# Patient Record
Sex: MALE | Race: ASIAN | NOT HISPANIC OR LATINO
[De-identification: names, ages, dates, MRNs, and addresses within clinical notes are randomized per-mention and may not be internally consistent; named-entity substitution may affect disease eponyms.]

---

## 2020-08-19 ENCOUNTER — APPOINTMENT (OUTPATIENT)
Dept: PEDIATRIC ALLERGY IMMUNOLOGY | Facility: CLINIC | Age: 50
End: 2020-08-19
Payer: MEDICAID

## 2020-08-19 VITALS
TEMPERATURE: 98.6 F | DIASTOLIC BLOOD PRESSURE: 79 MMHG | OXYGEN SATURATION: 98 % | WEIGHT: 260 LBS | HEART RATE: 93 BPM | BODY MASS INDEX: 40.81 KG/M2 | SYSTOLIC BLOOD PRESSURE: 118 MMHG | HEIGHT: 67 IN

## 2020-08-19 DIAGNOSIS — L30.8 OTHER SPECIFIED DERMATITIS: ICD-10-CM

## 2020-08-19 DIAGNOSIS — I10 ESSENTIAL (PRIMARY) HYPERTENSION: ICD-10-CM

## 2020-08-19 DIAGNOSIS — R73.03 PREDIABETES.: ICD-10-CM

## 2020-08-19 DIAGNOSIS — E78.00 PURE HYPERCHOLESTEROLEMIA, UNSPECIFIED: ICD-10-CM

## 2020-08-19 PROBLEM — Z00.00 ENCOUNTER FOR PREVENTIVE HEALTH EXAMINATION: Status: ACTIVE | Noted: 2020-08-19

## 2020-08-19 PROCEDURE — 99204 OFFICE O/P NEW MOD 45 MIN: CPT

## 2020-08-19 RX ORDER — ACETAMINOPHEN 325 MG/1
TABLET, FILM COATED ORAL
Refills: 0 | Status: ACTIVE | COMMUNITY

## 2020-08-19 NOTE — REASON FOR VISIT
[Initial Consultation] : an initial consultation for [To Medication] : allergy to medication [[employer] ] : provided by [employer]   [FreeTextEntry1] : [id number] [TWNoteComboBox1] : Jet

## 2020-08-19 NOTE — CONSULT LETTER
[Dear  ___] : Dear  [unfilled], [Consult Letter:] : I had the pleasure of evaluating your patient, [unfilled]. [Please see my note below.] : Please see my note below. [Consult Closing:] : Thank you very much for allowing me to participate in the care of this patient.  If you have any questions, please do not hesitate to contact me. [Sincerely,] : Sincerely, [FreeTextEntry3] : Anastasia Miles MD FAAANIL, NING\par Adult and Pediatric Allergy, Asthma and Clinical Immunology\par  of Medicine and Pediatrics at\par   [facility]\par Section Head, Adult Allergy and Immunology\par   [facility]\par   Division of Allergy, Asthma and Immunology\par   [address] [place] [address], [place] [address]\par   [place] [address]\par   Phone [phone]  Fax [phone]\par \par

## 2020-08-19 NOTE — HISTORY OF PRESENT ILLNESS
[Asthma] : asthma [Venom Reactions] : venom reactions [Food Allergies] : food allergies [de-identified] : YOSVANY ANDERSON  is a 50 year year old  male  with hypertension, hyperlipidemia, prediabetes, eczema [facility] of  multiple medication allergies. He was referred by PCP Dr Get Jiménez.\par \par Multiple drug reactions recorded to NSAIDs: Brufen, Novalgin, penicillin, sulfa/ Bactrim, tetracycline, doxycycline, minocycline, Cipro, Ofloxacin, Chloroquine, Chloromycetin. History of reactions to these medications is unclear. \par \par He is currently followed by dermatologist for skin rashes. \par Patient reports that he developed  swelling that he describes as water blisters  on the lips, fingers, toes and rectal area. He wasn't able to eat because of blisters in the mouth.  All of these reactions occur in [place]. Patient states that  he had total of 3 reactions (2007, 2008) He was told that these reactions were due to medication allergies and  symptoms resolved shortly  after taking an allergy medications. This list of drug allergies was given to him by a dermatologist in [place]. He hasn't had any reactions since 2008. \par

## 2020-08-19 NOTE — REVIEW OF SYSTEMS
[Atopic Dermatitis] : atopic dermatitis [Pruritus] : pruritus [Nl] : Hematologic/Lymphatic [Fatigue] : no fatigue [Fever] : no fever [Headache] : no headache [Limping] : no limping [FreeTextEntry7] : gas

## 2020-08-19 NOTE — PHYSICAL EXAM
[Alert] : alert [Healthy Appearance] : healthy appearance [No Acute Distress] : no acute distress [Well Developed] : well developed [No Photophobia] : no photophobia [Sclera Not Icteric] : sclera not icteric [No Discharge] : no discharge [No Thrush] : no thrush [No Oral Lesions or Ulcers] : no oral lesions or ulcers [No Neck Mass] : no neck mass was observed [Supple] : the neck was supple [Normal Rate and Effort] : normal respiratory rhythm and effort [Bilateral Audible Breath Sounds] : bilateral audible breath sounds [No Crackles] : no crackles [Normal Rate] : heart rate was normal  [Regular Rhythm] : with a regular rhythm [Soft] : abdomen soft [Not Tender] : non-tender [No HSM] : no hepato-splenomegaly [Normal Cervical Lymph Nodes] : cervical [No Skin Lesions] : no skin lesions [No Edema] : no edema [No clubbing] : no clubbing [No Cyanosis] : no cyanosis [Normal Mood] : mood was normal [Normal Affect] : affect was normal [Alert, Awake, Oriented as Age-Appropriate] : alert, awake, oriented as age appropriate [Normal Voice/Communication] : normal voice communication [Conjunctival Erythema] : no conjunctival erythema [Suborbital Bogginess] : no suborbital bogginess (allergic shiners) [Wheezing] : no wheezing was heard [Eczematous Patches] : eczematous patches present

## 2020-08-21 LAB
ALBUMIN SERPL ELPH-MCNC: 4.7 G/DL
ALP BLD-CCNC: 99 U/L
ALT SERPL-CCNC: 50 U/L
ANION GAP SERPL CALC-SCNC: 17 MMOL/L
AST SERPL-CCNC: 60 U/L
BASOPHILS # BLD AUTO: 0.03 K/UL
BASOPHILS NFR BLD AUTO: 0.4 %
BILIRUB SERPL-MCNC: 0.3 MG/DL
BUN SERPL-MCNC: 8 MG/DL
CALCIUM SERPL-MCNC: 9.6 MG/DL
CHLORIDE SERPL-SCNC: 102 MMOL/L
CO2 SERPL-SCNC: 24 MMOL/L
CREAT SERPL-MCNC: 0.74 MG/DL
EOSINOPHIL # BLD AUTO: 0.16 K/UL
EOSINOPHIL NFR BLD AUTO: 2.1 %
GLUCOSE SERPL-MCNC: 93 MG/DL
HCT VFR BLD CALC: 48.4 %
HGB BLD-MCNC: 15.4 G/DL
IMM GRANULOCYTES NFR BLD AUTO: 0.1 %
LYMPHOCYTES # BLD AUTO: 2.61 K/UL
LYMPHOCYTES NFR BLD AUTO: 33.6 %
MAN DIFF?: NORMAL
MCHC RBC-ENTMCNC: 30 PG
MCHC RBC-ENTMCNC: 31.8 GM/DL
MCV RBC AUTO: 94.3 FL
MONOCYTES # BLD AUTO: 0.86 K/UL
MONOCYTES NFR BLD AUTO: 11.1 %
NEUTROPHILS # BLD AUTO: 4.09 K/UL
NEUTROPHILS NFR BLD AUTO: 52.7 %
PLATELET # BLD AUTO: 248 K/UL
POTASSIUM SERPL-SCNC: 4.1 MMOL/L
PROT SERPL-MCNC: 7.7 G/DL
RBC # BLD: 5.13 M/UL
RBC # FLD: 13.1 %
SODIUM SERPL-SCNC: 143 MMOL/L
TOTAL IGE SMQN RAST: 168 KU/L
WBC # FLD AUTO: 7.76 K/UL

## 2020-09-15 ENCOUNTER — APPOINTMENT (OUTPATIENT)
Dept: PEDIATRIC ALLERGY IMMUNOLOGY | Facility: CLINIC | Age: 50
End: 2020-09-15

## 2020-09-21 ENCOUNTER — APPOINTMENT (OUTPATIENT)
Dept: PEDIATRIC ALLERGY IMMUNOLOGY | Facility: CLINIC | Age: 50
End: 2020-09-21

## 2020-12-01 ENCOUNTER — APPOINTMENT (OUTPATIENT)
Dept: PEDIATRIC ALLERGY IMMUNOLOGY | Facility: CLINIC | Age: 50
End: 2020-12-01
Payer: MEDICAID

## 2020-12-01 VITALS
HEART RATE: 87 BPM | TEMPERATURE: 96.8 F | DIASTOLIC BLOOD PRESSURE: 82 MMHG | BODY MASS INDEX: 40.65 KG/M2 | HEIGHT: 67 IN | SYSTOLIC BLOOD PRESSURE: 144 MMHG | WEIGHT: 259 LBS | OXYGEN SATURATION: 95 %

## 2020-12-01 DIAGNOSIS — T37.8X5A ADVERSE EFFECT OF OTHER SPECIFIED SYSTEMIC ANTI-INFECTIVES AND ANTIPARASITICS, INITIAL ENCOUNTER: ICD-10-CM

## 2020-12-01 DIAGNOSIS — T37.0X5A ADVERSE EFFECT OF SULFONAMIDES, INITIAL ENCOUNTER: ICD-10-CM

## 2020-12-01 DIAGNOSIS — T50.905A ADVERSE EFFECT OF UNSPECIFIED DRUGS, MEDICAMENTS AND BIOLOGICAL SUBSTANCES, INITIAL ENCOUNTER: ICD-10-CM

## 2020-12-01 DIAGNOSIS — T36.0X5A ADVERSE EFFECT OF PENICILLINS, INITIAL ENCOUNTER: ICD-10-CM

## 2020-12-01 PROCEDURE — 99214 OFFICE O/P EST MOD 30 MIN: CPT | Mod: 25

## 2020-12-01 PROCEDURE — 95018 ALL TSTG PERQ&IQ DRUGS/BIOL: CPT

## 2020-12-01 PROCEDURE — 99072 ADDL SUPL MATRL&STAF TM PHE: CPT

## 2020-12-01 RX ORDER — ISONIAZID 300 MG/1
300 TABLET ORAL
Refills: 0 | Status: ACTIVE | COMMUNITY

## 2020-12-01 RX ORDER — CLOTRIMAZOLE 10 MG/G
CREAM VAGINAL
Refills: 0 | Status: ACTIVE | COMMUNITY

## 2020-12-01 RX ORDER — MULTIVITAMIN
CAPSULE ORAL
Refills: 0 | Status: ACTIVE | COMMUNITY

## 2020-12-01 RX ORDER — LOSARTAN POTASSIUM AND HYDROCHLOROTHIAZIDE 12.5; 5 MG/1; MG/1
50-12.5 TABLET ORAL
Refills: 0 | Status: ACTIVE | COMMUNITY

## 2020-12-01 RX ORDER — ASPIRIN 81 MG
81 TABLET, DELAYED RELEASE (ENTERIC COATED) ORAL
Refills: 0 | Status: ACTIVE | COMMUNITY

## 2020-12-01 RX ORDER — LOSARTAN POTASSIUM 100 MG/1
TABLET, FILM COATED ORAL
Refills: 0 | Status: COMPLETED | COMMUNITY
End: 2020-12-01

## 2020-12-01 RX ORDER — LACTOBACILLUS ACIDOPHILUS/PECT 30 MG-20MG
TABLET ORAL
Refills: 0 | Status: ACTIVE | COMMUNITY

## 2020-12-01 RX ORDER — TRIAMCINOLONE ACETONIDE 1 MG/G
0.1 CREAM TOPICAL
Refills: 0 | Status: ACTIVE | COMMUNITY

## 2020-12-01 RX ORDER — GLIMEPIRIDE 4 MG/1
4 TABLET ORAL
Refills: 0 | Status: ACTIVE | COMMUNITY

## 2020-12-01 NOTE — IMPRESSION
[FreeTextEntry1] : Epicutaneous and intradermal skin tests were negative to Penicillin (10,000 U/mL), PrePen, Ampicillin, Cipro and Levofloxacin  with adequate controls.

## 2020-12-01 NOTE — REASON FOR VISIT
[Routine Follow-Up] : a routine follow-up visit for [Allergy Evaluation/ Skin Testing] : allergy evaluation and or skin testing [To Medication] : allergy to medication

## 2020-12-01 NOTE — PHYSICAL EXAM
[Alert] : alert [No Acute Distress] : no acute distress [Well Developed] : well developed [Normal Voice/Communication] : normal voice communication [No Discharge] : no discharge [No Photophobia] : no photophobia [Sclera Not Icteric] : sclera not icteric [Conjunctival Erythema] : no conjunctival erythema [No Thrush] : no thrush [No Oral Lesions or Ulcers] : no oral lesions or ulcers [Boggy Nasal Turbinates] : no boggy and/or pale nasal turbinates [No Neck Mass] : no neck mass was observed [Supple] : the neck was supple [Normal Rate and Effort] : normal respiratory rhythm and effort [No Crackles] : no crackles [Bilateral Audible Breath Sounds] : bilateral audible breath sounds [Wheezing] : no wheezing was heard [Normal Rate] : heart rate was normal  [Regular Rhythm] : with a regular rhythm [Soft] : abdomen soft [Not Tender] : non-tender [Normal Cervical Lymph Nodes] : cervical [No Skin Lesions] : no skin lesions [Eczematous Patches] : eczematous patches present [No clubbing] : no clubbing [No Edema] : no edema [No Cyanosis] : no cyanosis [Normal Mood] : mood was normal [Normal Affect] : affect was normal [Alert, Awake, Oriented as Age-Appropriate] : alert, awake, oriented as age appropriate

## 2020-12-01 NOTE — REVIEW OF SYSTEMS
[Fatigue] : no fatigue [Fever] : no fever [Limping] : no limping [Atopic Dermatitis] : atopic dermatitis [Pruritus] : pruritus [Nl] : Hematologic/Lymphatic [FreeTextEntry7] : gas

## 2020-12-01 NOTE — HISTORY OF PRESENT ILLNESS
[Asthma] : asthma [Venom Reactions] : venom reactions [Food Allergies] : food allergies [de-identified] : YOSVANY ANDERSON  is a 50 year year old  male  with hypertension, hyperlipidemia, prediabetes, eczema and multiple medication allergies. He was referred by PCP Dr Get Jiménez.\par \par - today he returns for penicillin and quinolone skin testing\par - no interval issues\par \par Multiple drug reactions recorded to NSAIDs: Brufen, Novalgin, penicillin, sulfa/ Bactrim, tetracycline, doxycycline, minocycline, Cipro, Ofloxacin, Chloroquine, Chloromycetin. History of reactions to these medications is unclear. \par \par He is currently followed by dermatologist for skin rashes. \par Patient reports that he developed  swelling that he describes as water blisters  on the lips, fingers, toes and rectal area. He wasn't able to eat because of blisters in the mouth.  All of these reactions occur in [place]. Patient states that  he had total of 3 reactions (2007, 2008) He was told that these reactions were due to medication allergies and  symptoms resolved shortly  after taking an allergy medications. This list of drug allergies was given to him by a dermatologist in [place]. He hasn't had any reactions since 2008. \par

## 2020-12-01 NOTE — CONSULT LETTER
[Dear  ___] : Dear  [unfilled], [Consult Letter:] : I had the pleasure of evaluating your patient, [unfilled]. [Please see my note below.] : Please see my note below. [Consult Closing:] : Thank you very much for allowing me to participate in the care of this patient.  If you have any questions, please do not hesitate to contact me. [Sincerely,] : Sincerely, [FreeTextEntry3] : Anastasia Miles MD FAAANIL, NING\par Adult and Pediatric Allergy, Asthma and Clinical Immunology\par  of Medicine and Pediatrics at\par   [facility]\par Section Head, Adult Allergy and Immunology\par   [facility]\par   Division of Allergy, Asthma and Immunology\par   [address] [place] [address], [place] [address]\par   [place] [address]\par   Phone [phone]  Fax [phone]\par \par

## 2020-12-08 ENCOUNTER — APPOINTMENT (OUTPATIENT)
Dept: PEDIATRIC ALLERGY IMMUNOLOGY | Facility: CLINIC | Age: 50
End: 2020-12-08
Payer: MEDICAID

## 2020-12-08 VITALS — SYSTOLIC BLOOD PRESSURE: 131 MMHG | DIASTOLIC BLOOD PRESSURE: 79 MMHG | RESPIRATION RATE: 18 BRPM | HEART RATE: 75 BPM

## 2020-12-08 VITALS — RESPIRATION RATE: 18 BRPM | SYSTOLIC BLOOD PRESSURE: 120 MMHG | HEART RATE: 101 BPM | DIASTOLIC BLOOD PRESSURE: 83 MMHG

## 2020-12-08 VITALS
WEIGHT: 259 LBS | OXYGEN SATURATION: 96 % | HEART RATE: 79 BPM | BODY MASS INDEX: 40.65 KG/M2 | TEMPERATURE: 96.3 F | SYSTOLIC BLOOD PRESSURE: 133 MMHG | HEIGHT: 67 IN | DIASTOLIC BLOOD PRESSURE: 84 MMHG

## 2020-12-08 VITALS — SYSTOLIC BLOOD PRESSURE: 152 MMHG | RESPIRATION RATE: 18 BRPM | HEART RATE: 81 BPM | DIASTOLIC BLOOD PRESSURE: 85 MMHG

## 2020-12-08 DIAGNOSIS — T36.0X5D ADVERSE EFFECT OF PENICILLINS, SUBSEQUENT ENCOUNTER: ICD-10-CM

## 2020-12-08 PROCEDURE — 95076 INGEST CHALLENGE INI 120 MIN: CPT

## 2020-12-08 PROCEDURE — 99072 ADDL SUPL MATRL&STAF TM PHE: CPT

## 2020-12-08 RX ORDER — AMOXICILLIN 500 MG/1
500 TABLET, FILM COATED ORAL
Qty: 4 | Refills: 0 | Status: ACTIVE | COMMUNITY
Start: 2020-12-08 | End: 1900-01-01

## 2020-12-08 NOTE — PHYSICAL EXAM
[Alert] : alert [No Acute Distress] : no acute distress [Well Developed] : well developed [Normal Voice/Communication] : normal voice communication [No Discharge] : no discharge [No Photophobia] : no photophobia [Sclera Not Icteric] : sclera not icteric [Conjunctival Erythema] : no conjunctival erythema [No Thrush] : no thrush [No Oral Lesions or Ulcers] : no oral lesions or ulcers [Boggy Nasal Turbinates] : no boggy and/or pale nasal turbinates [No Neck Mass] : no neck mass was observed [Supple] : the neck was supple [Normal Rate and Effort] : normal respiratory rhythm and effort [No Crackles] : no crackles [Bilateral Audible Breath Sounds] : bilateral audible breath sounds [Wheezing] : no wheezing was heard [Normal Rate] : heart rate was normal  [Regular Rhythm] : with a regular rhythm [Normal Cervical Lymph Nodes] : cervical [No Skin Lesions] : no skin lesions [Eczematous Patches] : eczematous patches present [No clubbing] : no clubbing [No Edema] : no edema [No Cyanosis] : no cyanosis [Normal Mood] : mood was normal [Normal Affect] : affect was normal [Alert, Awake, Oriented as Age-Appropriate] : alert, awake, oriented as age appropriate

## 2020-12-08 NOTE — PROCEDURE
[Patient ingested ___ amount of allergen] : Patient ingested [unfilled] amount of allergen [Pass] : Challenge: Pass [de-identified] : AMOXICILLIN GRADED CHALLENGE in a person with a history of  PENICILLIN/ AMOXICILLIN ALLERGY:\par YOSVANY ANDERSON  has a history of mild nonimmediate reaction to penicillin/amoxicillin. There is no reliable testing for delayed reactions available.  Skin testing is predictive only for immediate, IgE-mediated allergic reactions. People with negative skin test still have a chance of developing delayed hypersensitivity reaction. \par History is consistent with low-risk for penicillin/ amoxicillin allergy, therefore we proceeded with supervised medication challenge.\par \par Today YOSVANY ANDERSON took a  dose of Amoxicillin in 2 divided doses  in the office. Total dose was 1000 mg. He was observed for 1 hour and no adverse reactions were noted. See scanned flow sheet. He will complete total of 3 days of treatment and we will monitor for delayed reactions. \par Patient was instructed to stop medication and call our office promptly if she develops any rashes. Patient/ parents will call us in 2 weeks to confirm that he didn't have any delayed reaction. \par If there are no delayed reactions observed, we will remove penicillin from drug allergy list.\par

## 2020-12-08 NOTE — HISTORY OF PRESENT ILLNESS
[Consent obtained and signed form scanned in to chart] : Consent obtained and signed form scanned in to chart [] : The following medications are to be available during the challenge procedure: [Diphenhydramine] : Diphenhydramine, 1mg/kg PO (12.5 mg/5 cc) [___ mg] : Dose: [unfilled] mg [Epinephrine 1:1000 IM] : Epinephrine 1:1000 IM, 0.01cc/kg (max dose 0.5 cc) [Clear] : Skin Findings: Clear [No] : Reaction: No [___] : HR: [unfilled]  [___] : Amount: [unfilled] [___% 1) Skin -  A) Erythematous rash - % area involved] : Erythematous Rash (IA): [unfilled] % area involved [0 Pruritus: 0  - absent] : Pruritus (IB): 0 - absent [0 Urticaria/Angioedema: 0 - Absent] : Urticaria/Angioedema (IC): 0  - Absent [0 Rash: 0 - Absent] : Rash (ID): 0 - Absent [0 Sneezing/Itchin - Absent] : Sneezing/Itching (IIA): 0 - Absent [0 Nasal congestion: 0 - Absent] : Nasal congestion (IIB): 0 - Absent [0 Rhinorrhea: 0 - Absent] : Rhinorrhea (IIC): 0 - Absent [0 Laryngeal: 0 - Absent] : Laryngeal (IID): 0 - Absent [0 Wheezin - Absent] : Wheezing (IIIA): 0 - Absent [0 Gastro-Subjective complaints: 0 - Absent] : Gastro-Subjective Complaints (GEORGE): 0 - Absent [0 Gastro-Objective complaints: 0 - Absent] : Gastro-Objective Complaints (IVB): 0 - Absent [FreeTextEntry1] : amoxicillin [FreeTextEntry2] : 1000mg  [FreeTextEntry3] : /85 lung sounds clear  [FreeTextEntry4] : /79 [FreeTextEntry9] : /85 81 [de-identified] : /79 HR 75 [de-identified] : observation for 60 minutes. lung sounds clear, /83  R18  [de-identified] : pt tolerated procedure well. lung sounds clear. vital signs in normal range. pt was discharged per MD baker in stable condition.

## 2021-02-02 ENCOUNTER — APPOINTMENT (OUTPATIENT)
Dept: PEDIATRIC ALLERGY IMMUNOLOGY | Facility: CLINIC | Age: 51
End: 2021-02-02

## 2021-02-21 ENCOUNTER — EMERGENCY (EMERGENCY)
Facility: HOSPITAL | Age: 51
LOS: 1 days | Discharge: HOME | End: 2021-02-21
Attending: STUDENT IN AN ORGANIZED HEALTH CARE EDUCATION/TRAINING PROGRAM
Payer: SELF-PAY

## 2021-02-21 VITALS
DIASTOLIC BLOOD PRESSURE: 100 MMHG | TEMPERATURE: 99 F | SYSTOLIC BLOOD PRESSURE: 174 MMHG | RESPIRATION RATE: 18 BRPM | OXYGEN SATURATION: 96 % | HEART RATE: 111 BPM

## 2021-02-21 PROCEDURE — 74176 CT ABD & PELVIS W/O CONTRAST: CPT

## 2021-02-21 PROCEDURE — 72131 CT LUMBAR SPINE W/O DYE: CPT | Mod: 26,ME

## 2021-02-21 PROCEDURE — 74176 CT ABD & PELVIS W/O CONTRAST: CPT | Mod: 26,MG

## 2021-02-21 PROCEDURE — 99285 EMERGENCY DEPT VISIT HI MDM: CPT

## 2021-02-21 PROCEDURE — 99284 EMERGENCY DEPT VISIT MOD MDM: CPT | Mod: 25

## 2021-02-21 PROCEDURE — G1004: CPT

## 2021-02-21 RX ORDER — LIDOCAINE 4 G/100G
1 CREAM TOPICAL ONCE
Refills: 0 | Status: COMPLETED | OUTPATIENT
Start: 2021-02-21 | End: 2021-02-21

## 2021-02-21 RX ORDER — LIDOCAINE 4 G/100G
1 CREAM TOPICAL
Qty: 15 | Refills: 0
Start: 2021-02-21

## 2021-02-21 RX ORDER — CYCLOBENZAPRINE HYDROCHLORIDE 10 MG/1
10 TABLET, FILM COATED ORAL ONCE
Refills: 0 | Status: COMPLETED | OUTPATIENT
Start: 2021-02-21 | End: 2021-02-21

## 2021-02-21 RX ORDER — CYCLOBENZAPRINE HYDROCHLORIDE 10 MG/1
1 TABLET, FILM COATED ORAL
Qty: 25 | Refills: 0
Start: 2021-02-21

## 2021-02-21 RX ORDER — ACETAMINOPHEN 500 MG
975 TABLET ORAL ONCE
Refills: 0 | Status: COMPLETED | OUTPATIENT
Start: 2021-02-21 | End: 2021-02-21

## 2021-02-21 RX ADMIN — Medication 975 MILLIGRAM(S): at 22:36

## 2021-02-21 RX ADMIN — LIDOCAINE 1 PATCH: 4 CREAM TOPICAL at 22:37

## 2021-02-21 RX ADMIN — CYCLOBENZAPRINE HYDROCHLORIDE 10 MILLIGRAM(S): 10 TABLET, FILM COATED ORAL at 22:36

## 2021-02-21 NOTE — ED PROVIDER NOTE - PATIENT PORTAL LINK FT
You can access the FollowMyHealth Patient Portal offered by [facility] by registering at the following website: [email or web address]. By joining [facility]’s FollowMyHealth portal, you will also be able to view your health information using other applications (apps) compatible with our system.

## 2021-02-21 NOTE — ED PROVIDER NOTE - CLINICAL SUMMARY MEDICAL DECISION MAKING FREE TEXT BOX
imaging reviewed.  CT with no acute path.  mild peripancreatic inflammation but no abd pain, or vomiting.  Pt instructed to follow-up with gi regarding this finding. pt feeling better with meds in Ed. instructed to return for worsening pain, numbness/weakness, or any other concerns.  Pt given a copy of all results and instructed to f/up with pcp regarding any abnormal results.

## 2021-02-21 NOTE — ED PROVIDER NOTE - CARE PROVIDER_API CALL
Blue Garrido)  Gastroenterology; Internal Medicine  [address] [place] [address]  Phone: [phone]  Fax: [phone]  Follow Up Time: 1-3 Days
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
## 2021-02-21 NOTE — ED PROVIDER NOTE - PHYSICAL EXAMINATION
Constitutional - well-developed; well nourished. Head - NCAT. Airway patent. Eyes - PERRL. CV - RRR. no murmur. no edema. Pulm - CTAB. Abd - soft, nt. no rebound. no guarding. Neuro - A&Ox3. strength 5/5 x4. sensation intact x4. normal gait. Skin - No rash. MSK - normal ROM. no c-spine ttp. R paraspinal lumbar ttp.
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
## 2021-03-29 ENCOUNTER — APPOINTMENT (OUTPATIENT)
Dept: PEDIATRIC ALLERGY IMMUNOLOGY | Facility: CLINIC | Age: 51
End: 2021-03-29
Payer: MEDICAID

## 2021-03-29 VITALS
WEIGHT: 260 LBS | BODY MASS INDEX: 40.81 KG/M2 | SYSTOLIC BLOOD PRESSURE: 123 MMHG | HEIGHT: 67 IN | HEART RATE: 79 BPM | OXYGEN SATURATION: 96 % | TEMPERATURE: 96.3 F | DIASTOLIC BLOOD PRESSURE: 78 MMHG

## 2021-03-29 VITALS — HEART RATE: 71 BPM | SYSTOLIC BLOOD PRESSURE: 141 MMHG | DIASTOLIC BLOOD PRESSURE: 65 MMHG | OXYGEN SATURATION: 96 %

## 2021-03-29 DIAGNOSIS — Z88.9 ALLERGY STATUS TO UNSPECIFIED DRUGS, MEDICAMENTS AND BIOLOGICAL SUBSTANCES: ICD-10-CM

## 2021-03-29 DIAGNOSIS — Z01.89 ENCOUNTER FOR OTHER SPECIFIED SPECIAL EXAMINATIONS: ICD-10-CM

## 2021-03-29 DIAGNOSIS — L81.9 DISORDER OF PIGMENTATION, UNSPECIFIED: ICD-10-CM

## 2021-03-29 PROCEDURE — 99072 ADDL SUPL MATRL&STAF TM PHE: CPT

## 2021-03-29 PROCEDURE — 95076 INGEST CHALLENGE INI 120 MIN: CPT

## 2021-03-29 NOTE — HISTORY OF PRESENT ILLNESS
[de-identified] : \par YOSVANY ANDERSON is a 50 year year old male with hypertension, hyperlipidemia, prediabetes, eczema and multiple medication allergies. He was referred by PCP Dr Get Jiménez, here for Ibuprofen. \par \par Last visit he passed Amoxicillin challenge. Subsequently, took amoxicillin for two days without any issues. \par \par Multiple drug reactions recorded to NSAIDs: Brufen, Novalgin, penicillin, sulfa/ Bactrim, tetracycline, doxycycline, minocycline, Cipro, Ofloxacin, Chloroquine, Chloromycetin. History of reactions to these medications is unclear. \par \par He is currently followed by dermatologist for skin rashes. \par Patient reports that he developed swelling that he describes as water blisters on the lips, fingers, toes and rectal area. He wasn't able to eat because of blisters in the mouth. All of these reactions occur in [place]. Patient states that he had total of 3 reactions (2007, 2008) He was told that these reactions were due to medication allergies and symptoms resolved shortly after taking an allergy medications. This list of drug allergies was given to him by a dermatologist in [place]. He hasn't had any reactions since 2008. \par  \par No history or symptoms of asthma, venom reactions, food allergies. \par  [] : The following medications are to be available during the challenge procedure: [Diphenhydramine] : Diphenhydramine, 1-2mg/kg IM (max dose 50mg), (50mg/1 cc) [Epinephrine 1:1000 IM] : Epinephrine 1:1000 IM, 0.01cc/kg (max dose 0.5 cc) [Albuterol MDI] : Albuterol MDI, 2 - 4 puffs [_______] : Time: [unfilled] [Clear] : Skin Findings: Clear [No] : Reaction: No [___] : HR: [unfilled]  [___% 1) Skin -  A) Erythematous rash - % area involved] : Erythematous Rash (IA): [unfilled] % area involved [___] : Time: [unfilled] [0 Pruritus: 0  - absent] : Pruritus (IB): 0 - absent [0 Urticaria/Angioedema: 0 - Absent] : Urticaria/Angioedema (IC): 0  - Absent [0 Rash: 0 - Absent] : Rash (ID): 0 - Absent [0 Sneezing/Itchin - Absent] : Sneezing/Itching (IIA): 0 - Absent [0 Nasal congestion: 0 - Absent] : Nasal congestion (IIB): 0 - Absent [0 Rhinorrhea: 0 - Absent] : Rhinorrhea (IIC): 0 - Absent [0 Laryngeal: 0 - Absent] : Laryngeal (IID): 0 - Absent [0 Wheezin - Absent] : Wheezing (IIIA): 0 - Absent [0 Gastro-Subjective complaints: 0 - Absent] : Gastro-Subjective Complaints (GEORGE): 0 - Absent [0 Gastro-Objective complaints: 0 - Absent] : Gastro-Objective Complaints (IVB): 0 - Absent [Antihistamine use in past 5 days] : No antihistamine use in past 5 days [Recent Illness] : no recent illness [Asthma] : no asthma [Asthma well controlled?] : asthma well controlled: no [de-identified] : YOSVANY ANDERSON is a 50 year year old male with hypertension, hyperlipidemia, prediabetes, eczema and multiple medication allergies. He was referred by PCP Dr Get Jiménez, here for Ibuprofen. \par \par Last visit he passed Amoxicillin challenge. Subsequently, took amoxicillin for two days without any issues. \par \par Multiple drug reactions recorded to NSAIDs: Brufen, Novalgin, penicillin, sulfa/ Bactrim, tetracycline, doxycycline, minocycline, Cipro, Ofloxacin, Chloroquine, Chloromycetin. History of reactions to these medications is unclear. \par \par He is currently followed by dermatologist for skin rashes. \par \par  \par  [FreeTextEntry9] : No reaction  [de-identified] : no reaction.

## 2021-03-29 NOTE — PHYSICAL EXAM
[Suborbital Bogginess] : no suborbital bogginess (allergic shiners) [No murmur] : no murmur [Normal Mood] : mood was normal [Normal Affect] : affect was normal [Judgment and Insight Age Appropriate] : judgement and insight is age appropriate [Alert, Awake, Oriented as Age-Appropriate] : alert, awake, oriented as age appropriate [Alert] : alert [Well Nourished] : well nourished [No Acute Distress] : no acute distress [Well Developed] : well developed [Sclera Not Icteric] : sclera not icteric [Normal TMs] : both tympanic membranes were normal [Normal Tonsils] : normal tonsils [Normal Rate and Effort] : normal respiratory rhythm and effort [No Crackles] : no crackles [Bilateral Audible Breath Sounds] : bilateral audible breath sounds [Normal Rate] : heart rate was normal  [Normal S1, S2] : normal S1 and S2 [Regular Rhythm] : with a regular rhythm [Normal Cervical Lymph Nodes] : cervical [Skin Intact] : skin intact  [No Rash] : no rash [Conjunctival Erythema] : no conjunctival erythema [Boggy Nasal Turbinates] : no boggy and/or pale nasal turbinates [Pharyngeal erythema] : no pharyngeal erythema [Posterior Pharyngeal Cobblestoning] : no posterior pharyngeal cobblestoning [Clear Rhinorrhea] : no clear rhinorrhea was seen [Exudate] : no exudate [Wheezing] : no wheezing was heard [Patches] : no patches

## 2021-03-29 NOTE — REVIEW OF SYSTEMS
[Eye Discharge] : no eye discharge [Nasal Congestion] : no nasal congestion [Snoring] : no snoring [Chest Pain] : no chest pain or discomfort [Exercise Intolerance] : no persistence of exercise intolerance [Nocturnal Awakening] : no nocturnal awakening with shortness of breath [Cough] : no cough [Wheezing Worsens With Exercise] : wheezing does not worsen with exercise [Vomiting] : no vomiting [Diarrhea] : no diarrhea [Nl] : Respiratory [Fatigue] : no fatigue [Puffy Eyelids] : no puffy ~T eyelids [Nosebleeds] : no epistaxis [Rhinorrhea] : no rhinorrhea [Post Nasal Drip] : no post nasal drip [Sneezing] : no sneezing [Urticaria] : no urticaria [Pruritus] : no pruritus [Dry Skin] : no ~L dry skin [de-identified] : +

## 2021-03-29 NOTE — REVIEW OF SYSTEMS
[Eye Discharge] : no eye discharge [Nasal Congestion] : no nasal congestion [Snoring] : no snoring [Chest Pain] : no chest pain or discomfort [Exercise Intolerance] : no persistence of exercise intolerance [Nocturnal Awakening] : no nocturnal awakening with shortness of breath [Cough] : no cough [Wheezing Worsens With Exercise] : wheezing does not worsen with exercise [Vomiting] : no vomiting [Diarrhea] : no diarrhea [Nl] : Respiratory [Fatigue] : no fatigue [Puffy Eyelids] : no puffy ~T eyelids [Nosebleeds] : no epistaxis [Rhinorrhea] : no rhinorrhea [Post Nasal Drip] : no post nasal drip [Sneezing] : no sneezing [Urticaria] : no urticaria [Pruritus] : no pruritus [Dry Skin] : no ~L dry skin [de-identified] : +

## 2021-03-29 NOTE — HISTORY OF PRESENT ILLNESS
[de-identified] : \par YOSVANY ANDERSON is a 50 year year old male with hypertension, hyperlipidemia, prediabetes, eczema and multiple medication allergies. He was referred by PCP Dr Get Jiménez, here for Ibuprofen. \par \par Last visit he passed Amoxicillin challenge. Subsequently, took amoxicillin for two days without any issues. \par \par Multiple drug reactions recorded to NSAIDs: Brufen, Novalgin, penicillin, sulfa/ Bactrim, tetracycline, doxycycline, minocycline, Cipro, Ofloxacin, Chloroquine, Chloromycetin. History of reactions to these medications is unclear. \par \par He is currently followed by dermatologist for skin rashes. \par Patient reports that he developed swelling that he describes as water blisters on the lips, fingers, toes and rectal area. He wasn't able to eat because of blisters in the mouth. All of these reactions occur in [place]. Patient states that he had total of 3 reactions (2007, 2008) He was told that these reactions were due to medication allergies and symptoms resolved shortly after taking an allergy medications. This list of drug allergies was given to him by a dermatologist in [place]. He hasn't had any reactions since 2008. \par  \par No history or symptoms of asthma, venom reactions, food allergies. \par  [] : The following medications are to be available during the challenge procedure: [Diphenhydramine] : Diphenhydramine, 1-2mg/kg IM (max dose 50mg), (50mg/1 cc) [Epinephrine 1:1000 IM] : Epinephrine 1:1000 IM, 0.01cc/kg (max dose 0.5 cc) [Albuterol MDI] : Albuterol MDI, 2 - 4 puffs [_______] : Time: [unfilled] [Clear] : Skin Findings: Clear [No] : Reaction: No [___] : HR: [unfilled]  [___% 1) Skin -  A) Erythematous rash - % area involved] : Erythematous Rash (IA): [unfilled] % area involved [___] : Time: [unfilled] [0 Pruritus: 0  - absent] : Pruritus (IB): 0 - absent [0 Urticaria/Angioedema: 0 - Absent] : Urticaria/Angioedema (IC): 0  - Absent [0 Rash: 0 - Absent] : Rash (ID): 0 - Absent [0 Sneezing/Itchin - Absent] : Sneezing/Itching (IIA): 0 - Absent [0 Nasal congestion: 0 - Absent] : Nasal congestion (IIB): 0 - Absent [0 Rhinorrhea: 0 - Absent] : Rhinorrhea (IIC): 0 - Absent [0 Laryngeal: 0 - Absent] : Laryngeal (IID): 0 - Absent [0 Wheezin - Absent] : Wheezing (IIIA): 0 - Absent [0 Gastro-Subjective complaints: 0 - Absent] : Gastro-Subjective Complaints (GEORGE): 0 - Absent [0 Gastro-Objective complaints: 0 - Absent] : Gastro-Objective Complaints (IVB): 0 - Absent [Antihistamine use in past 5 days] : No antihistamine use in past 5 days [Recent Illness] : no recent illness [Asthma] : no asthma [Asthma well controlled?] : asthma well controlled: no [de-identified] : YOSVANY ANDERSON is a 50 year year old male with hypertension, hyperlipidemia, prediabetes, eczema and multiple medication allergies. He was referred by PCP Dr Get Jiménez, here for Ibuprofen. \par \par Last visit he passed Amoxicillin challenge. Subsequently, took amoxicillin for two days without any issues. \par \par Multiple drug reactions recorded to NSAIDs: Brufen, Novalgin, penicillin, sulfa/ Bactrim, tetracycline, doxycycline, minocycline, Cipro, Ofloxacin, Chloroquine, Chloromycetin. History of reactions to these medications is unclear. \par \par He is currently followed by dermatologist for skin rashes. \par \par  \par  [FreeTextEntry9] : No reaction  [de-identified] : no reaction.

## 2021-09-17 DIAGNOSIS — Z01.818 ENCOUNTER FOR OTHER PREPROCEDURAL EXAMINATION: ICD-10-CM

## 2021-09-18 ENCOUNTER — APPOINTMENT (OUTPATIENT)
Dept: DISASTER EMERGENCY | Facility: CLINIC | Age: 51
End: 2021-09-18

## 2021-09-19 LAB — SARS-COV-2 N GENE NPH QL NAA+PROBE: NOT DETECTED

## 2021-09-21 ENCOUNTER — APPOINTMENT (OUTPATIENT)
Dept: PULMONOLOGY | Facility: CLINIC | Age: 51
End: 2021-09-21
Payer: MEDICAID

## 2021-09-21 VITALS
HEART RATE: 83 BPM | HEIGHT: 67.5 IN | BODY MASS INDEX: 38.47 KG/M2 | DIASTOLIC BLOOD PRESSURE: 87 MMHG | TEMPERATURE: 97.5 F | SYSTOLIC BLOOD PRESSURE: 133 MMHG | WEIGHT: 248 LBS | OXYGEN SATURATION: 98 %

## 2021-09-21 VITALS
DIASTOLIC BLOOD PRESSURE: 87 MMHG | SYSTOLIC BLOOD PRESSURE: 133 MMHG | TEMPERATURE: 97.5 F | OXYGEN SATURATION: 98 % | WEIGHT: 248 LBS | BODY MASS INDEX: 38.47 KG/M2 | HEART RATE: 83 BPM | HEIGHT: 67.5 IN

## 2021-09-21 VITALS — BODY MASS INDEX: 38.16 KG/M2 | WEIGHT: 246 LBS | TEMPERATURE: 98.7 F | HEART RATE: 76 BPM | HEIGHT: 67.5 IN

## 2021-09-21 DIAGNOSIS — R07.9 CHEST PAIN, UNSPECIFIED: ICD-10-CM

## 2021-09-21 PROCEDURE — 94726 PLETHYSMOGRAPHY LUNG VOLUMES: CPT

## 2021-09-21 PROCEDURE — 94010 BREATHING CAPACITY TEST: CPT

## 2021-09-21 PROCEDURE — ZZZZZ: CPT

## 2021-09-21 PROCEDURE — 94729 DIFFUSING CAPACITY: CPT

## 2021-09-21 PROCEDURE — 99203 OFFICE O/P NEW LOW 30 MIN: CPT | Mod: 25
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
## 2021-09-21 NOTE — ASSESSMENT
[FreeTextEntry1] : 51 year-old M with PMH as above who presented to the clinic for evaluation after an ED visit for chest pain. He currently reports doing well and has no complaints.\par \par Chest Pain\par - No shortness of breath or pain since his ED visit on 8/1/21\par - PFTs reviewed, no evidence of obstruction\par - CT Chest report reviewed, will need to review images\par - Patient given letter to present to radiology department to obtain CD of images, instructed to bring the CD back to this clinic for review\par - Follow up in 2 weeks for image review\par \par Trey Verdugo, PGY-6\par Pulmonary and Critical Care Medicine

## 2021-09-21 NOTE — REASON FOR VISIT
[Initial] : an initial visit [Chest Pain] : chest pain [Abnormal CXR/ Chest CT] : an abnormal CXR/ chest CT

## 2021-10-06 PROBLEM — I10 ESSENTIAL HYPERTENSION: Status: ACTIVE | Noted: 2020-08-19

## 2022-10-05 ENCOUNTER — EMERGENCY (EMERGENCY)
Facility: HOSPITAL | Age: 52
LOS: 0 days | Discharge: HOME | End: 2022-10-05
Attending: EMERGENCY MEDICINE

## 2022-10-05 VITALS
WEIGHT: 220.02 LBS | DIASTOLIC BLOOD PRESSURE: 86 MMHG | OXYGEN SATURATION: 99 % | RESPIRATION RATE: 16 BRPM | TEMPERATURE: 99 F | HEART RATE: 97 BPM | HEIGHT: 67 IN | SYSTOLIC BLOOD PRESSURE: 120 MMHG

## 2022-10-05 VITALS
SYSTOLIC BLOOD PRESSURE: 133 MMHG | RESPIRATION RATE: 17 BRPM | TEMPERATURE: 98 F | OXYGEN SATURATION: 98 % | DIASTOLIC BLOOD PRESSURE: 85 MMHG | HEART RATE: 71 BPM

## 2022-10-05 DIAGNOSIS — Z88.2 ALLERGY STATUS TO SULFONAMIDES: ICD-10-CM

## 2022-10-05 DIAGNOSIS — Z88.8 ALLERGY STATUS TO OTHER DRUGS, MEDICAMENTS AND BIOLOGICAL SUBSTANCES STATUS: ICD-10-CM
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
## 2022-10-05 DIAGNOSIS — S16.1XXA STRAIN OF MUSCLE, FASCIA AND TENDON AT NECK LEVEL, INITIAL ENCOUNTER: ICD-10-CM

## 2022-10-05 DIAGNOSIS — M54.9 DORSALGIA, UNSPECIFIED: ICD-10-CM

## 2022-10-05 DIAGNOSIS — M54.2 CERVICALGIA: ICD-10-CM

## 2022-10-05 PROCEDURE — 99283 EMERGENCY DEPT VISIT LOW MDM: CPT

## 2022-10-05 RX ORDER — METHOCARBAMOL 500 MG/1
1 TABLET, FILM COATED ORAL
Qty: 15 | Refills: 0
Start: 2022-10-05 | End: 2022-10-09

## 2022-10-05 RX ORDER — ACETAMINOPHEN 500 MG
975 TABLET ORAL ONCE
Refills: 0 | Status: COMPLETED | OUTPATIENT
Start: 2022-10-05 | End: 2022-10-05

## 2022-10-05 RX ORDER — METHOCARBAMOL 500 MG/1
1500 TABLET, FILM COATED ORAL ONCE
Refills: 0 | Status: COMPLETED | OUTPATIENT
Start: 2022-10-05 | End: 2022-10-05

## 2022-10-05 RX ORDER — IBUPROFEN 200 MG
1 TABLET ORAL
Qty: 20 | Refills: 0
Start: 2022-10-05 | End: 2022-10-09

## 2022-10-05 RX ADMIN — Medication 975 MILLIGRAM(S): at 22:58

## 2022-10-05 RX ADMIN — METHOCARBAMOL 1500 MILLIGRAM(S): 500 TABLET, FILM COATED ORAL at 22:58
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
## 2022-10-05 NOTE — ED PROVIDER NOTE - PHYSICAL EXAMINATION
Vitals: WNL  Gen: AAOx3, NAD, sitting comfortably in stretcher  Head: ncat, perrla, eomi b/l  Neck: supple, no lymphadenopathy, no midline deviation, + cervical paraspinal muscle spasm b/l, no midline tenderness or stepoff   Heart: rrr, no m/r/g  Lungs: CTA b/l, no rales/ronchi/wheezes  Abd: soft, nontender, non-distended, no rebound or guarding  Ext: no clubbing/cyanosis/edema  Neuro: sensation and muscle strength intact b/l, steady gait, CN2-12 intact b/l, no focal weakness or sensory loss   musculo: L lumbar paraspinal muscle spasm present

## 2022-10-05 NOTE — ED PROVIDER NOTE - CARE PROVIDER_API CALL
Marcelo Rivera (DO)  Orthopaedic Surgery Surgery  [address], [address]  [place] [address]  Phone: [phone]  Fax: [phone]  Follow Up Time: Urgent

## 2022-10-05 NOTE — ED ADULT NURSE NOTE - CHPI ED NUR SYMPTOMS NEG
no acting out behaviors/no decreased eating/drinking/no difficulty bearing weight/no disorientation/no dizziness/no laceration/no loss of consciousness

## 2022-10-05 NOTE — ED PROVIDER NOTE - PROGRESS NOTE DETAILS
Pt. reports feeling better after meds, ambulating without issue   pt. agrees to f/u with primary care outpt. asap, referred to ortho for additional f/u given back pain   pt. understands to return to ED if symptoms worsen; will d/c with meds for symptoms

## 2022-10-05 NOTE — ED PROVIDER NOTE - PATIENT PORTAL LINK FT
You can access the FollowMyHealth Patient Portal offered by [facility] by registering at the following website: [email or web address]. By joining [facility]’s FollowMyHealth portal, you will also be able to view your health information using other applications (apps) compatible with our system.